# Patient Record
Sex: FEMALE
[De-identification: names, ages, dates, MRNs, and addresses within clinical notes are randomized per-mention and may not be internally consistent; named-entity substitution may affect disease eponyms.]

---

## 2021-05-04 ENCOUNTER — APPOINTMENT (OUTPATIENT)
Dept: OTOLARYNGOLOGY | Facility: CLINIC | Age: 21
End: 2021-05-04
Payer: COMMERCIAL

## 2021-05-04 VITALS — WEIGHT: 130 LBS | TEMPERATURE: 98.1 F | BODY MASS INDEX: 20.89 KG/M2 | HEIGHT: 66 IN

## 2021-05-04 DIAGNOSIS — Z87.09 PERSONAL HISTORY OF OTHER DISEASES OF THE RESPIRATORY SYSTEM: ICD-10-CM

## 2021-05-04 DIAGNOSIS — Z87.891 PERSONAL HISTORY OF NICOTINE DEPENDENCE: ICD-10-CM

## 2021-05-04 DIAGNOSIS — S01.339A PUNCTURE WOUND W/OUT FOREIGN BODY OF UNSPECIFIED EAR, INITIAL ENCOUNTER: ICD-10-CM

## 2021-05-04 DIAGNOSIS — H60.392 OTHER INFECTIVE OTITIS EXTERNA, LEFT EAR: ICD-10-CM

## 2021-05-04 PROBLEM — Z00.00 ENCOUNTER FOR PREVENTIVE HEALTH EXAMINATION: Status: ACTIVE | Noted: 2021-05-04

## 2021-05-04 PROCEDURE — 99204 OFFICE O/P NEW MOD 45 MIN: CPT

## 2021-05-04 PROCEDURE — 99072 ADDL SUPL MATRL&STAF TM PHE: CPT

## 2021-05-04 RX ORDER — CLINDAMYCIN HYDROCHLORIDE 300 MG/1
CAPSULE ORAL
Refills: 0 | Status: ACTIVE | COMMUNITY

## 2021-05-04 RX ORDER — MUPIROCIN 20 MG/G
2 OINTMENT TOPICAL
Refills: 0 | Status: ACTIVE | COMMUNITY

## 2021-05-04 RX ORDER — LEVOFLOXACIN 500 MG/1
500 TABLET, FILM COATED ORAL DAILY
Qty: 7 | Refills: 1 | Status: ACTIVE | COMMUNITY
Start: 2021-05-04 | End: 1900-01-01

## 2021-05-04 NOTE — ASSESSMENT
[FreeTextEntry1] : She has cellulitis of the left superior auricle following an ear piercing. There was a small collection of pus as well. The piercing was removed.\par \par Plan\par -Findings and management options were discussed with the patient.\par -I recommended that she clean the area with peroxide solution and apply the antibiotic ointment 2-3 times a day.\par -She is on clindamycin. We are going to have her stop that and switch to Levaquin to provide better coverage for pseudomonas as the cartilage is inflamed. She was cautioned about the side effects of tendinitis/tendon rupture. She was asked to avoid strenuous activity including running. If she has any joint or muscle pain, she should stop the antibiotic and call us\par -I am going to see her back next week. I asked her to call and return urgently if she has any worsening symptoms or concerns

## 2021-05-04 NOTE — HISTORY OF PRESENT ILLNESS
[de-identified] : DONAL OLMEDO is a 20 year patient Here for a left ear infection. She was referred by Dr. Lopez. She had the superior portion of the left auricular cartilage pierced last Wednesday. She said that her friend accidentally hit her ear on Saturday. She then developed an infection with pain, swelling, and redness. She was started on clindamycin and mupirocin ointment yesterday. The ear may be a little bit more swollen. She still has tenderness.

## 2021-05-04 NOTE — CONSULT LETTER
[Dear  ___] : Dear  [unfilled], [Consult Letter:] : I had the pleasure of evaluating your patient, [unfilled]. [Please see my note below.] : Please see my note below. [Consult Closing:] : Thank you very much for allowing me to participate in the care of this patient.  If you have any questions, please do not hesitate to contact me. [Sincerely,] : Sincerely, [FreeTextEntry3] : Renae Duarte MD\par

## 2021-05-12 ENCOUNTER — APPOINTMENT (OUTPATIENT)
Dept: OTOLARYNGOLOGY | Facility: CLINIC | Age: 21
End: 2021-05-12

## 2024-01-24 ENCOUNTER — OFFICE (OUTPATIENT)
Dept: URBAN - METROPOLITAN AREA CLINIC 28 | Facility: CLINIC | Age: 24
Setting detail: OPHTHALMOLOGY
End: 2024-01-24
Payer: MEDICARE

## 2024-01-24 ENCOUNTER — RX ONLY (RX ONLY)
Age: 24
End: 2024-01-24

## 2024-01-24 DIAGNOSIS — H16.042: ICD-10-CM

## 2024-01-24 PROCEDURE — 92002 INTRM OPH EXAM NEW PATIENT: CPT | Performed by: OPHTHALMOLOGY

## 2024-01-24 ASSESSMENT — REFRACTION_CURRENTRX
OD_AXIS: 001
OS_AXIS: 177
OS_OVR_VA: 20/
OD_CYLINDER: -1.00
OD_SPHERE: -2.00
OD_OVR_VA: 20/
OS_CYLINDER: -1.00
OS_SPHERE: -2.75

## 2024-01-24 ASSESSMENT — CORNEAL SURGICAL SCARRING: OS_SCARRING: PERIPHERAL ANTERIOR STROMAL

## 2024-02-01 ENCOUNTER — OFFICE (OUTPATIENT)
Dept: URBAN - METROPOLITAN AREA CLINIC 28 | Facility: CLINIC | Age: 24
Setting detail: OPHTHALMOLOGY
End: 2024-02-01
Payer: MEDICARE

## 2024-02-01 DIAGNOSIS — H16.042: ICD-10-CM

## 2024-02-01 PROCEDURE — 92012 INTRM OPH EXAM EST PATIENT: CPT | Performed by: OPHTHALMOLOGY

## 2024-02-01 ASSESSMENT — REFRACTION_CURRENTRX
OD_AXIS: 001
OD_CYLINDER: -1.00
OD_SPHERE: -2.00
OD_OVR_VA: 20/
OS_OVR_VA: 20/
OS_CYLINDER: -1.00
OS_AXIS: 177
OS_SPHERE: -2.75

## 2024-02-01 ASSESSMENT — DRY EYES - PHYSICIAN NOTES: OS_GENERALCOMMENTS: POOR TEAR FILM

## 2024-02-01 ASSESSMENT — CORNEAL SURGICAL SCARRING: OS_SCARRING: PERIPHERAL ANTERIOR STROMAL
